# Patient Record
Sex: FEMALE | Race: WHITE
[De-identification: names, ages, dates, MRNs, and addresses within clinical notes are randomized per-mention and may not be internally consistent; named-entity substitution may affect disease eponyms.]

---

## 2018-04-05 ENCOUNTER — HOSPITAL ENCOUNTER (OUTPATIENT)
Dept: HOSPITAL 53 - M OPP | Age: 58
Discharge: HOME | End: 2018-04-05
Attending: SURGERY
Payer: COMMERCIAL

## 2018-04-05 DIAGNOSIS — K58.9: ICD-10-CM

## 2018-04-05 DIAGNOSIS — Z80.3: ICD-10-CM

## 2018-04-05 DIAGNOSIS — Z80.41: ICD-10-CM

## 2018-04-05 DIAGNOSIS — K44.9: ICD-10-CM

## 2018-04-05 DIAGNOSIS — K22.8: ICD-10-CM

## 2018-04-05 DIAGNOSIS — M72.0: ICD-10-CM

## 2018-04-05 DIAGNOSIS — Z79.899: ICD-10-CM

## 2018-04-05 DIAGNOSIS — M46.07: ICD-10-CM

## 2018-04-05 DIAGNOSIS — I73.00: ICD-10-CM

## 2018-04-05 DIAGNOSIS — F17.200: ICD-10-CM

## 2018-04-05 DIAGNOSIS — Z80.42: ICD-10-CM

## 2018-04-05 DIAGNOSIS — K22.2: ICD-10-CM

## 2018-04-05 DIAGNOSIS — Z80.1: ICD-10-CM

## 2018-04-05 DIAGNOSIS — R13.10: Primary | ICD-10-CM

## 2018-04-05 DIAGNOSIS — G47.30: ICD-10-CM

## 2018-04-05 DIAGNOSIS — K21.9: ICD-10-CM

## 2018-04-05 PROCEDURE — 43249 ESOPH EGD DILATION <30 MM: CPT

## 2018-04-05 RX ADMIN — SODIUM CHLORIDE 1 MLS/HR: 9 INJECTION, SOLUTION INTRAVENOUS at 09:15

## 2020-02-08 ENCOUNTER — HOSPITAL ENCOUNTER (OUTPATIENT)
Dept: HOSPITAL 53 - M WUC | Age: 60
End: 2020-02-08
Attending: PHYSICIAN ASSISTANT
Payer: COMMERCIAL
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
## 2020-02-08 DIAGNOSIS — S52.532A: Primary | ICD-10-CM

## 2020-02-08 DIAGNOSIS — S30.0XXA: ICD-10-CM

## 2020-02-08 NOTE — REP
Clinical:  Trauma.  Fall.

 

Technique:  AP, lateral, bilateral oblique views of the left wrist.

 

Findings:

Comminuted Colles' fracture of the distal radial metaphysis with posterior

angulation and soft tissue swelling.

 

Impression:

Comminuted Colles' fracture of the distal radial metaphysis.

 

 

Electronically Signed by

Luis Estrella MD 02/08/2020 12:59 P

## 2020-02-08 NOTE — REP
Clinical:  slip and fall with back pain .

 

Technique:  AP, lateral, bilateral oblique, and coned-down views.

 

Findings:  Alignment and lordosis is maintained.  The vertebral bodies including

transverse process and spinous processes are intact and normal.  There is no

evidence for acute fracture / compression injury or subluxation.  No evidence for

spondylolysis or spondylolisthesis.  No significant degenerative change is

noted.

 

Impression:

Normal lumbosacral spine radiograph series.

 

 

Electronically Signed by

Luis Estrella MD 02/08/2020 12:58 P

## 2020-02-08 NOTE — REP
Clinical:  Trauma.  Fall.

 

Technique:  AP and lateral views of the sacrum (three total views).

 

Findings:

No definite acute fracture.  However, subtle nondisplaced distal sacral injury

cannot be excluded.  Bilateral sacroiliac joints are relatively age-appropriate.

 

 

Impression:

No definite acute fracture.  However, subtle nondisplaced distal sacral injury

cannot be excluded.

 

 

Electronically Signed by

Luis Estrella MD 02/08/2020 01:02 P

## 2020-02-08 NOTE — REP
Clinical:  Trauma.  Fall.

 

Technique:  AP, lateral, oblique views of the left hand.

 

Findings:

There is a comminuted Colles' fracture of the distal radial metaphysis with

posterior angulation and soft tissue swelling.  Acute versus chronic fracture at

the fifth digit distal interphalangeal joint cannot be excluded and should be

correlated clinically.

 

Impression:

Comminuted Colles' fracture of the distal radius.

Acute versus old injury at the fifth digit DIP joint.

 

 

Electronically Signed by

Luis Estrella MD 02/08/2020 01:00 P

## 2022-04-12 ENCOUNTER — HOSPITAL ENCOUNTER (OUTPATIENT)
Dept: HOSPITAL 53 - M WHC | Age: 62
End: 2022-04-12
Attending: NURSE PRACTITIONER
Payer: COMMERCIAL

## 2022-04-12 DIAGNOSIS — Z80.3: ICD-10-CM

## 2022-04-12 DIAGNOSIS — Z80.0: ICD-10-CM

## 2022-04-12 DIAGNOSIS — Z12.31: Primary | ICD-10-CM

## 2022-04-12 DIAGNOSIS — M81.0: ICD-10-CM

## 2024-09-04 ENCOUNTER — HOSPITAL ENCOUNTER (OUTPATIENT)
Dept: HOSPITAL 53 - M WHC | Age: 64
End: 2024-09-04
Payer: COMMERCIAL

## 2024-09-04 DIAGNOSIS — Z12.31: Primary | ICD-10-CM

## 2024-09-04 DIAGNOSIS — Z13.820: ICD-10-CM
